# Patient Record
Sex: MALE | Race: WHITE | NOT HISPANIC OR LATINO | Employment: STUDENT | URBAN - METROPOLITAN AREA
[De-identification: names, ages, dates, MRNs, and addresses within clinical notes are randomized per-mention and may not be internally consistent; named-entity substitution may affect disease eponyms.]

---

## 2021-01-20 ENCOUNTER — TELEPHONE (OUTPATIENT)
Dept: FAMILY MEDICINE CLINIC | Facility: CLINIC | Age: 21
End: 2021-01-20

## 2021-02-03 NOTE — PROGRESS NOTES
FAMILY PRACTICE HEALTH MAINTENANCE OFFICE VISIT  [facility]    NAME: Nia Burns  AGE: 24 y o  SEX: male  : 2000     DATE: 2021    Assessment and Plan     1  Well adult exam    2  Post concussion syndrome  -     Ambulatory referral to Sports Medicine; Future    3  Nausea  Comments:  Advised pepcid otc bid and will check labs as above  Advised he should see GI due to his longstanding symptoms  Orders:  -     CBC and differential; Future  -     Comprehensive metabolic panel; Future  -     Celiac Disease Antibody Profile; Future  -     Ambulatory referral to Gastroenterology; Future    4  Other fatigue  -     TSH, 3rd generation with Free T4 reflex; Future  -     Thyroid Antibodies Panel; Future    5  Screening for cardiovascular condition  -     CBC and differential; Future  -     Comprehensive metabolic panel; Future  -     Lipid panel; Future    Suspect multiple nonspecific symptoms due to anxiety and depression  However, he denies this is the case and denies psych referral   Offered follow up but he prefers to follow up as needed after labwork and will see his specialist referrals as recommended  · Patient Counseling:   · Nutrition: Stressed importance of a well balanced diet, moderation of sodium/saturated fat, caloric balance and sufficient intake of fiber  · Exercise: Stressed the importance of regular exercise with a goal of 150 minutes per week  · Dental Health: Discussed daily flossing and brushing and regular dental visits     · Immunizations reviewed: Up To Date  · Discussed benefits of:  Screening labs  BMI Counseling: Body mass index is 19 8 kg/m²  Discussed with patient's BMI with him  The BMI is normal       Chief Complaint     Chief Complaint   Patient presents with    Establish Care     Needs PCP kim    Physical Exam       History of Present Illness     NP  Here for CPE  Has history of a concussion in 2019  Ulices Morgan down the steps   Got persistently worse and went to the ER, was diagnosed with concussion  Was having issues with short term memory, trouble concentrating, cognitive skills were worse, had headaches and nausea  Went to see someone for the concussion and was diagnosed with "post concussion syndrome "  They referred him to neurospsych but never actually gave him a referral   He feels as though his symptoms are back to baseline as far as that goes but will take a referral for the concussion syndrome  Now having nausea nearly daily and feels he can't keep anything down  Has lost about 14 pounds in the past couple of months  Has had constipation and has been to the ER for this in the past   No bloodwork in the past 2 years  The nausea has been worse ever since the concussion  Well Adult Physical   Patient here for a comprehensive physical exam       Diet and Physical Activity  Diet: well balanced diet  Exercise: rarely      Depression Screen  PHQ-9 Depression Screening    PHQ-9:   Frequency of the following problems over the past two weeks:      Little interest or pleasure in doing things: 2 - more than half the days  Feeling down, depressed, or hopeless: 3 - nearly every day  Trouble falling or staying asleep, or sleeping too much: 3 - nearly every day  Feeling tired or having little energy: 3 - nearly every day  Poor appetite or overeating: 3 - nearly every day  Feeling bad about yourself - or that you are a failure or have let yourself or your family down: 3 - nearly every day  Trouble concentrating on things, such as reading the newspaper or watching television: 3 - nearly every day  Moving or speaking so slowly that other people could have noticed   Or the opposite - being so fidgety or restless that you have been moving around a lot more than usual: 0 - not at all  Thoughts that you would be better off dead, or of hurting yourself in some way: 0 - not at all  PHQ-2 Score: 5  PHQ-9 Score: 20          General Health  Hearing: Normal:  bilateral  Vision: no vision problems  Dental: regular dental visits    Reproductive Health  No issues       The following portions of the patient's history were reviewed and updated as appropriate: allergies, current medications, past family history, past medical history, past social history, past surgical history and problem list     Review of Systems     Review of Systems   Constitutional: Negative  HENT: Negative  Eyes: Negative  Respiratory: Negative  Cardiovascular: Negative  Gastrointestinal: Positive for nausea  Endocrine: Negative  Genitourinary: Negative  Musculoskeletal: Negative  Skin: Negative  Allergic/Immunologic: Negative  Neurological: Positive for headaches  Hematological: Negative  Psychiatric/Behavioral: Positive for dysphoric mood and sleep disturbance  Negative for self-injury and suicidal ideas  Past Medical History     Past Medical History:   Diagnosis Date    Chest pain        Past Surgical History     History reviewed  No pertinent surgical history      Social History     Social History     Socioeconomic History    Marital status: Single     Spouse name: None    Number of children: None    Years of education: None    Highest education level: None   Occupational History    None   Social Needs    Financial resource strain: None    Food insecurity     Worry: None     Inability: None    Transportation needs     Medical: None     Non-medical: None   Tobacco Use    Smoking status: Current Some Day Smoker     Types: Cigarettes    Smokeless tobacco: Never Used   Substance and Sexual Activity    Alcohol use: Never     Frequency: Never    Drug use: Not Currently     Types: Marijuana    Sexual activity: None   Lifestyle    Physical activity     Days per week: None     Minutes per session: None    Stress: None   Relationships    Social connections     Talks on phone: None     Gets together: None     Attends Anglican service: None     Active member of club or organization: None     Attends meetings of clubs or organizations: None     Relationship status: None    Intimate partner violence     Fear of current or ex partner: None     Emotionally abused: None     Physically abused: None     Forced sexual activity: None   Other Topics Concern    None   Social History Narrative    None       Family History     Family History   Problem Relation Age of Onset    Hypothyroidism Mother     Hypertension Mother     Thyroid cancer Father     No Known Problems Brother        Current Medications     No current outpatient medications on file  Allergies     No Known Allergies    Objective     /68   Pulse 86   Temp (!) 97 2 °F (36 2 °C)   Resp 18   Ht 5' 9 5" (1 765 m)   Wt 61 7 kg (136 lb)   SpO2 97%   BMI 19 80 kg/m²      Physical Exam  Constitutional:       General: He is not in acute distress  Appearance: He is well-developed  HENT:      Head: Normocephalic and atraumatic  Right Ear: External ear normal       Left Ear: External ear normal       Nose: Nose normal    Eyes:      Conjunctiva/sclera: Conjunctivae normal       Pupils: Pupils are equal, round, and reactive to light  Neck:      Musculoskeletal: Normal range of motion and neck supple  Thyroid: No thyromegaly  Cardiovascular:      Rate and Rhythm: Normal rate and regular rhythm  Heart sounds: No murmur  No friction rub  No gallop  Pulmonary:      Effort: Pulmonary effort is normal       Breath sounds: Normal breath sounds  No wheezing or rales  Abdominal:      General: Bowel sounds are normal  There is no distension  Palpations: Abdomen is soft  Tenderness: There is abdominal tenderness in the epigastric area  Musculoskeletal: Normal range of motion  General: No tenderness or deformity  Lymphadenopathy:      Cervical: No cervical adenopathy  Skin:     General: Skin is dry  Findings: No rash  Neurological:      Mental Status: He is alert and oriented to person, place, and time  Cranial Nerves: No cranial nerve deficit  Motor: No abnormal muscle tone  Coordination: Coordination normal       Deep Tendon Reflexes: Reflexes are normal and symmetric  Reflexes normal    Psychiatric:         Mood and Affect: Mood is depressed  Behavior: Behavior normal          Thought Content: Thought content does not include homicidal or suicidal ideation  Thought content does not include homicidal or suicidal plan           Judgment: Judgment normal             Visual Acuity Screening    Right eye Left eye Both eyes   Without correction:      With correction: 13 20/15 [address] [facility]

## 2021-02-08 ENCOUNTER — OFFICE VISIT (OUTPATIENT)
Dept: FAMILY MEDICINE CLINIC | Facility: CLINIC | Age: 21
End: 2021-02-08
Payer: COMMERCIAL

## 2021-02-08 VITALS
HEART RATE: 86 BPM | SYSTOLIC BLOOD PRESSURE: 110 MMHG | OXYGEN SATURATION: 97 % | DIASTOLIC BLOOD PRESSURE: 68 MMHG | HEIGHT: 70 IN | BODY MASS INDEX: 19.47 KG/M2 | RESPIRATION RATE: 18 BRPM | WEIGHT: 136 LBS | TEMPERATURE: 97.2 F

## 2021-02-08 DIAGNOSIS — Z00.00 WELL ADULT EXAM: Primary | ICD-10-CM

## 2021-02-08 DIAGNOSIS — R11.0 NAUSEA: ICD-10-CM

## 2021-02-08 DIAGNOSIS — F07.81 POST CONCUSSION SYNDROME: ICD-10-CM

## 2021-02-08 DIAGNOSIS — Z13.6 SCREENING FOR CARDIOVASCULAR CONDITION: ICD-10-CM

## 2021-02-08 DIAGNOSIS — R53.83 OTHER FATIGUE: ICD-10-CM

## 2021-02-08 PROBLEM — Z13.31 POSITIVE DEPRESSION SCREENING: Status: ACTIVE | Noted: 2021-02-08

## 2021-02-08 PROCEDURE — 3725F SCREEN DEPRESSION PERFORMED: CPT | Performed by: INTERNAL MEDICINE

## 2021-02-08 PROCEDURE — 99385 PREV VISIT NEW AGE 18-39: CPT | Performed by: INTERNAL MEDICINE

## 2021-02-12 VITALS
HEART RATE: 70 BPM | DIASTOLIC BLOOD PRESSURE: 80 MMHG | BODY MASS INDEX: 20.14 KG/M2 | WEIGHT: 136 LBS | SYSTOLIC BLOOD PRESSURE: 129 MMHG | HEIGHT: 69 IN

## 2021-02-12 DIAGNOSIS — F07.81 POST CONCUSSION SYNDROME: Primary | ICD-10-CM

## 2021-02-12 DIAGNOSIS — R41.3 MEMORY CHANGE: ICD-10-CM

## 2021-02-12 DIAGNOSIS — F32.A DEPRESSION, UNSPECIFIED DEPRESSION TYPE: ICD-10-CM

## 2021-02-12 PROCEDURE — 99203 OFFICE O/P NEW LOW 30 MIN: CPT | Performed by: ORTHOPAEDIC SURGERY

## 2021-02-12 NOTE — PROGRESS NOTES
Assessment/Plan:  1  Post concussion syndrome  Ambulatory referral to Sports Medicine    Ambulatory referral to Psychiatry    Ambulatory referral to Physical Therapy    Ambulatory referral to Neurology   2  Depression, unspecified depression type  Ambulatory referral to Psychiatry    Ambulatory referral to Physical Therapy    Ambulatory referral to Neurology   3  Memory change  Ambulatory referral to Psychiatry    Ambulatory referral to Physical Therapy    Ambulatory referral to Neurology         Alireza Nick has a head injury over 1 year ago and was diagnosed with a concussion  He has ongoing symptoms today ever since that injury  His situation is quite complicated and I did have a lengthy discussion with him today about several recommendations  I informed him that my expertise in concussions is typically involved with acute injury seen right after the concussion itself  Prolonged injuries are best managed by a neurologist so I did give him a referral to a neurologist for further evaluation     I often see patients with prolong symptoms and there can be a certain element  Of continued symptoms at her secondary to another cause  I do feel that his depression is a significant factor in a lot of his symptoms and can explain a lot of what he is currently experiencing with the sleep disturbance, nausea and fatigue  I recommended consultation with psychiatrist and he was agreeable to this  I also recommended he undergo occupational therapy in neuro vestibular concussion therapy this can help with his visual disturbances and memory loss  He verbalized understanding of all of these recommendations and will follow up with referrals  He he does not need to follow-up with me and should continue with neurologist at this time as well as a psychiatrist       Patient ID: Asad Marte is a 24 y o  male   Presents to the office for evaluation for concussion which occurred over 1 year ago    He states he had a fall down the stairs on 12/18/2019  He was drinking alcohol at that time when he fell  He had symptoms of confusion and memory loss after he fell  Several days later he went to the emergency room and was evaluated  He had a CT at that time which was negative for a bleed or fracture  He states he did not follow-up with any family physician or concussion specialist at that time  He states he saw someone several months later who diagnosed with postconcussion syndrome and referred him to neuro psych  He was never given a physical referral and did not know where to go after that point  He has been complaining of persistent symptoms for the last several months and feels like the symptoms seem to be worsening  He has persistent headaches, fatigue, constipation, nausea and significant insomnia  He states he has a history of depression and this seems to be worsening as well but no formal clinical diagnosis and never any medical management for this  He states he has always had trouble sleeping ever since his head injury this sleeping has been significantly disturbed  Injury Description:  Date / Time:    12/18/2019  Injury Description:   Fall down stairs  Location:  Unknown  Cause:  Fall  LOC:  yes  Amnesia:   Retrograde:  yes   Anterograde:  yes   Seizures:  No  ER Visit: Yes  CT Scan:  Yes     Concussion Risk Factors:  History of Concussion: Yes, 3 prior  History of Migraines: No  Family History of Headache:  Yes  If yes, who?  mom  Developmental History:  none  Psychiatric History:  Depression  History of Sleep Disorder:  Yes  Do symptoms worsen with Physical Activity? No  Do symptoms worsen with Cognitive Activity?   Yes  What percent is this person back to normal?  Patient 85 %    Symptoms Checklist      Most Recent Value   Physical   Headache  1   Nausea  1   Vomiting  0   Balance problems  1   Dizziness  1   Visual problems  1   Fatigue  1   Sensitivity to light  1   Sensitivity to noise  0   Numbness / tingling  1   TOTAL PHYSICAL SCORE  8   Cognitive   Foggy  1   Slowed down  1   Difficulty concentrating  1   Difficulty remembering  1   TOTAL COGNITIVE SCORE  4   Emotional   Irritability  1   Sadness  1   More emotional  1   Nervousness  1   TOTAL EMOTIONAL SCORE  4   Sleep   Drowsiness  1   Sleeping less  1   Sleeping more  0   Difficulty falling asleep  1   TOTAL SLEEP SCORE  3   TOTAL SYMPTOM SCORE  19          Review of Systems   Constitutional: Positive for fatigue  Negative for chills, fever and unexpected weight change  HENT: Negative for hearing loss, nosebleeds and sore throat  Eyes: Positive for photophobia  Negative for pain, redness and visual disturbance  Respiratory: Negative for cough, shortness of breath and wheezing  Cardiovascular: Negative for chest pain, palpitations and leg swelling  Gastrointestinal: Positive for constipation and nausea  Negative for abdominal pain and vomiting  Endocrine: Negative for polyphagia and polyuria  Genitourinary: Negative for dysuria and hematuria  Musculoskeletal:        See HPI   Skin: Negative for rash and wound  Neurological: Positive for dizziness and headaches  Negative for numbness  Psychiatric/Behavioral: Positive for decreased concentration and sleep disturbance  Negative for suicidal ideas  The patient is nervous/anxious  Past Medical History:   Diagnosis Date    Chest pain        History reviewed  No pertinent surgical history      Family History   Problem Relation Age of Onset   Brandon Barriga Hypothyroidism Mother     Hypertension Mother     Thyroid cancer Father     No Known Problems Brother        Social History     Occupational History    Not on file   Tobacco Use    Smoking status: Current Some Day Smoker     Types: Cigarettes    Smokeless tobacco: Never Used   Substance and Sexual Activity    Alcohol use: Never     Frequency: Never    Drug use: Not Currently     Types: Marijuana    Sexual activity: Not on file       No current outpatient medications on file  No Known Allergies    Objective:  Vitals:    02/12/21 0930   BP: 129/80   Pulse: 70       Ortho Exam    Physical Exam  Vitals signs reviewed  Constitutional:       Appearance: He is well-developed  HENT:      Head: Normocephalic and atraumatic  Eyes:      Conjunctiva/sclera: Conjunctivae normal       Pupils: Pupils are equal, round, and reactive to light  Neck:      Musculoskeletal: Normal range of motion and neck supple  Cardiovascular:      Rate and Rhythm: Normal rate  Pulmonary:      Effort: Pulmonary effort is normal  No respiratory distress  Musculoskeletal:      Comments: As noted in HPI   Skin:     General: Skin is warm and dry  Neurological:      Mental Status: He is alert and oriented to person, place, and time     Psychiatric:         Behavior: Behavior normal          General:   NAD:  Yes  Psych:   AAOX3:  Yes   Mood and Affect:  Normal  HEENT:   Lacerations:  No   Bruising:  No   PEERLA:  Yes   EOMI: Yes   Fracture/Trauma:  No    Vestibular Ocular:     Gaze stability:    Nystagmus: no    Saccades: no/horizontal/vertical: headache with horizontal movement and headache with vertical movement    Vestibular Motion: normal    Convergence:  6cm  Neuro:   CNII - XII Intact:  Yes   Examination of Coordination:    Limited Balance:   No    Romberg:  normal    Forward Tandem Gait:  normal    Backward Tandem Gait:   normal    Eyes Close Tandem Gait:   normal        FTN: normal    Diadochokinesia: normal

## 2021-02-26 ENCOUNTER — CONSULT (OUTPATIENT)
Dept: GASTROENTEROLOGY | Facility: CLINIC | Age: 21
End: 2021-02-26
Payer: COMMERCIAL

## 2021-02-26 VITALS
SYSTOLIC BLOOD PRESSURE: 116 MMHG | DIASTOLIC BLOOD PRESSURE: 78 MMHG | HEIGHT: 69 IN | BODY MASS INDEX: 19.99 KG/M2 | TEMPERATURE: 97.9 F | HEART RATE: 78 BPM | WEIGHT: 135 LBS

## 2021-02-26 DIAGNOSIS — R11.0 NAUSEA: ICD-10-CM

## 2021-02-26 PROCEDURE — 99204 OFFICE O/P NEW MOD 45 MIN: CPT | Performed by: INTERNAL MEDICINE

## 2021-02-26 PROCEDURE — 3008F BODY MASS INDEX DOCD: CPT | Performed by: INTERNAL MEDICINE

## 2021-02-26 RX ORDER — FAMOTIDINE 20 MG/1
20 TABLET, FILM COATED ORAL 2 TIMES DAILY
Qty: 60 TABLET | Refills: 11 | Status: SHIPPED | OUTPATIENT
Start: 2021-02-26 | End: 2021-03-08

## 2021-02-26 RX ORDER — IBUPROFEN 200 MG
TABLET ORAL EVERY 6 HOURS PRN
COMMUNITY
End: 2021-03-23

## 2021-02-26 RX ORDER — ONDANSETRON 4 MG/1
4 TABLET, FILM COATED ORAL EVERY 8 HOURS PRN
Qty: 15 TABLET | Refills: 1 | Status: SHIPPED | OUTPATIENT
Start: 2021-02-26

## 2021-02-26 NOTE — PROGRESS NOTES
Consultation - [address] [facility]  Zak Valles 2000 male         Chief Complaint:  Nausea    HPI:  81-year-old male with history of concussion status post fall in December 2019 reports having lot of different issues since then  He sees neurologist   Reports having symptoms of anxiety, depression, insomnia, memory issues, decreased cognitive skills, vertigo, nausea  Complaining about discomfort in the upper abdomen  His symptoms are worse with stress  Denies any specific relationship with meal   Denies any NSAID use  He tells me that he was told by his neurologist that most of these symptoms are due to concussion  Regular bowel movements and denies any blood or mucus in the stool  He reports having few loose bowel movements last week  He was ordered for celiac disease panel by his PCP  REVIEW OF SYSTEMS: Review of Systems   Constitutional: Negative for activity change, appetite change, chills, diaphoresis, fatigue, fever and unexpected weight change  HENT: Negative for ear discharge, ear pain, facial swelling, hearing loss, nosebleeds, sore throat, tinnitus and voice change  Eyes: Negative for pain, discharge, redness, itching and visual disturbance  Respiratory: Negative for apnea, cough, chest tightness, shortness of breath and wheezing  Cardiovascular: Negative for chest pain and palpitations  Gastrointestinal:        As noted in HPI   Endocrine: Negative for cold intolerance, heat intolerance and polyuria  Genitourinary: Negative for difficulty urinating, dysuria, flank pain, hematuria and urgency  Musculoskeletal: Negative for arthralgias, back pain, gait problem, joint swelling and myalgias  Skin: Negative for rash and wound  Neurological: Negative for dizziness, tremors, seizures, speech difficulty, light-headedness, numbness and headaches  Hematological: Negative for adenopathy  Does not bruise/bleed easily     Psychiatric/Behavioral: Negative for agitation, behavioral problems and confusion  The patient is not nervous/anxious  Past Medical History:   Diagnosis Date    Chest pain       History reviewed  No pertinent surgical history    Social History     Socioeconomic History    Marital status: Single     Spouse name: Not on file    Number of children: Not on file    Years of education: Not on file    Highest education level: Not on file   Occupational History    Not on file   Social Needs    Financial resource strain: Not on file    Food insecurity     Worry: Not on file     Inability: Not on file    Transportation needs     Medical: Not on file     Non-medical: Not on file   Tobacco Use    Smoking status: Current Some Day Smoker     Packs/day: 0 25     Types: Cigarettes    Smokeless tobacco: Current User   Substance and Sexual Activity    Alcohol use: Yes     Frequency: Never     Comment: socially     Drug use: Yes     Types: Marijuana     Comment: socially     Sexual activity: Not on file   Lifestyle    Physical activity     Days per week: Not on file     Minutes per session: Not on file    Stress: Not on file   Relationships    Social connections     Talks on phone: Not on file     Gets together: Not on file     Attends Buddhism service: Not on file     Active member of club or organization: Not on file     Attends meetings of clubs or organizations: Not on file     Relationship status: Not on file    Intimate partner violence     Fear of current or ex partner: Not on file     Emotionally abused: Not on file     Physically abused: Not on file     Forced sexual activity: Not on file   Other Topics Concern    Not on file   Social History Narrative    Not on file     Family History   Problem Relation Age of Onset    Hypothyroidism Mother     Hypertension Mother     Thyroid cancer Father     No Known Problems Brother      Other  Current Outpatient Medications   Medication Sig Dispense Refill    ibuprofen (MOTRIN) 200 mg tablet Take by mouth every 6 (six) hours as needed for mild pain As needed      famotidine (PEPCID) 20 mg tablet Take 1 tablet (20 mg total) by mouth 2 (two) times a day 60 tablet 11    ondansetron (ZOFRAN) 4 mg tablet Take 1 tablet (4 mg total) by mouth every 8 (eight) hours as needed for nausea or vomiting 15 tablet 1     No current facility-administered medications for this visit  Blood pressure 116/78, pulse 78, temperature 97 9 °F (36 6 °C), height 5' 9" (1 753 m), weight 61 2 kg (135 lb)  PHYSICAL EXAM: Physical Exam  Constitutional:       Appearance: He is well-developed  HENT:      Head: Normocephalic and atraumatic  Eyes:      General: No scleral icterus  Right eye: No discharge  Left eye: No discharge  Conjunctiva/sclera: Conjunctivae normal       Pupils: Pupils are equal, round, and reactive to light  Neck:      Musculoskeletal: Neck supple  Thyroid: No thyromegaly  Vascular: No JVD  Trachea: No tracheal deviation  Cardiovascular:      Rate and Rhythm: Normal rate and regular rhythm  Heart sounds: Normal heart sounds  No murmur  No friction rub  No gallop  Pulmonary:      Effort: Pulmonary effort is normal  No respiratory distress  Breath sounds: Normal breath sounds  No wheezing or rales  Chest:      Chest wall: No tenderness  Abdominal:      General: Bowel sounds are normal  There is no distension  Palpations: Abdomen is soft  There is no mass  Tenderness: There is no abdominal tenderness  There is no guarding or rebound  Hernia: No hernia is present  Lymphadenopathy:      Cervical: No cervical adenopathy  Skin:     General: Skin is warm and dry  Findings: No erythema or rash  Neurological:      Mental Status: He is alert and oriented to person, place, and time  Psychiatric:         Behavior: Behavior normal          Thought Content:  Thought content normal           Lab Results   Component Value Date    WBC 6 76 03/20/2016    HGB 12 9 03/20/2016    HCT 38 9 03/20/2016    MCV 80 (L) 03/20/2016     03/20/2016     Lab Results   Component Value Date    CALCIUM 8 8 03/20/2016    K 3 8 03/20/2016    CO2 28 03/20/2016     03/20/2016    BUN 12 03/20/2016    CREATININE 0 60 03/20/2016     Lab Results   Component Value Date    ALT 21 03/20/2016    AST 25 03/20/2016    ALKPHOS 610 (H) 03/20/2016     No results found for: INR, PROTIME    Ct Abdomen Pelvis With Contrast    Result Date: 3/21/2016  Impression: 1  No acute inflammatory or infectious process identified  There is minimal free pelvic fluid noted however, an abnormal finding in a young male patient  Consider occult enteritis  2   Increased colorectal stool noted  Findings are consistent with the preliminary report from [facility] which was provided shortly after completion of the exam              Workstation performed: [id number]       ASSESSMENT & PLAN:    Nausea  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; rule out peptic ulcer disease or gastric erosions  Rule out H pylori gastritis  Doubt for biliary pathology  -explained to patient in detail about different possible etiologies and given reassurance  Explained about the pathophysiology of functional symptoms probably IBS related  -Patient was explained about the lifestyle and dietary modifications  Advised to avoid fatty foods, chocolates, caffeine, alcohol and any other triggering foods  Avoid eating for at least 3 hours before going to bed         -take Pepcid regularly     -try Zofran as needed    -schedule for EGD    -check right upper quadrant ultrasound    -advised him to get the celiac disease panel    -Patient was explained about  the risks and benefits of the procedure  Risks including but not limited to bleeding, infection, perforation were explained in detail  Also explained about less than 100% sensitivity with the exam and other alternatives

## 2021-02-26 NOTE — ASSESSMENT & PLAN NOTE
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; rule out peptic ulcer disease or gastric erosions  Rule out H pylori gastritis  Doubt for biliary pathology  -explained to patient in detail about different possible etiologies and given reassurance  Explained about the pathophysiology of functional symptoms probably IBS related  -Patient was explained about the lifestyle and dietary modifications  Advised to avoid fatty foods, chocolates, caffeine, alcohol and any other triggering foods  Avoid eating for at least 3 hours before going to bed         -take Pepcid regularly     -try Zofran as needed    -schedule for EGD    -check right upper quadrant ultrasound    -advised him to get the celiac disease panel    -Patient was explained about  the risks and benefits of the procedure  Risks including but not limited to bleeding, infection, perforation were explained in detail  Also explained about less than 100% sensitivity with the exam and other alternatives

## 2021-02-26 NOTE — H&P (VIEW-ONLY)
Consultation - [facility]  Kami Menain 2000 male         Chief Complaint:  Nausea    HPI:  81-year-old male with history of concussion status post fall in December 2019 reports having lot of different issues since then  He sees neurologist   Reports having symptoms of anxiety, depression, insomnia, memory issues, decreased cognitive skills, vertigo, nausea  Complaining about discomfort in the upper abdomen  His symptoms are worse with stress  Denies any specific relationship with meal   Denies any NSAID use  He tells me that he was told by his neurologist that most of these symptoms are due to concussion  Regular bowel movements and denies any blood or mucus in the stool  He reports having few loose bowel movements last week  He was ordered for celiac disease panel by his PCP  REVIEW OF SYSTEMS: Review of Systems   Constitutional: Negative for activity change, appetite change, chills, diaphoresis, fatigue, fever and unexpected weight change  HENT: Negative for ear discharge, ear pain, facial swelling, hearing loss, nosebleeds, sore throat, tinnitus and voice change  Eyes: Negative for pain, discharge, redness, itching and visual disturbance  Respiratory: Negative for apnea, cough, chest tightness, shortness of breath and wheezing  Cardiovascular: Negative for chest pain and palpitations  Gastrointestinal:        As noted in HPI   Endocrine: Negative for cold intolerance, heat intolerance and polyuria  Genitourinary: Negative for difficulty urinating, dysuria, flank pain, hematuria and urgency  Musculoskeletal: Negative for arthralgias, back pain, gait problem, joint swelling and myalgias  Skin: Negative for rash and wound  Neurological: Negative for dizziness, tremors, seizures, speech difficulty, light-headedness, numbness and headaches  Hematological: Negative for adenopathy  Does not bruise/bleed easily     Psychiatric/Behavioral: Negative for agitation, behavioral problems and confusion  The patient is not nervous/anxious  Past Medical History:   Diagnosis Date    Chest pain       History reviewed  No pertinent surgical history    Social History     Socioeconomic History    Marital status: Single     Spouse name: Not on file    Number of children: Not on file    Years of education: Not on file    Highest education level: Not on file   Occupational History    Not on file   Social Needs    Financial resource strain: Not on file    Food insecurity     Worry: Not on file     Inability: Not on file    Transportation needs     Medical: Not on file     Non-medical: Not on file   Tobacco Use    Smoking status: Current Some Day Smoker     Packs/day: 0 25     Types: Cigarettes    Smokeless tobacco: Current User   Substance and Sexual Activity    Alcohol use: Yes     Frequency: Never     Comment: socially     Drug use: Yes     Types: Marijuana     Comment: socially     Sexual activity: Not on file   Lifestyle    Physical activity     Days per week: Not on file     Minutes per session: Not on file    Stress: Not on file   Relationships    Social connections     Talks on phone: Not on file     Gets together: Not on file     Attends Muslim service: Not on file     Active member of club or organization: Not on file     Attends meetings of clubs or organizations: Not on file     Relationship status: Not on file    Intimate partner violence     Fear of current or ex partner: Not on file     Emotionally abused: Not on file     Physically abused: Not on file     Forced sexual activity: Not on file   Other Topics Concern    Not on file   Social History Narrative    Not on file     Family History   Problem Relation Age of Onset    Hypothyroidism Mother     Hypertension Mother     Thyroid cancer Father     No Known Problems Brother      Other  Current Outpatient Medications   Medication Sig Dispense Refill    ibuprofen (MOTRIN) 200 mg tablet Take by mouth every 6 (six) hours as needed for mild pain As needed      famotidine (PEPCID) 20 mg tablet Take 1 tablet (20 mg total) by mouth 2 (two) times a day 60 tablet 11    ondansetron (ZOFRAN) 4 mg tablet Take 1 tablet (4 mg total) by mouth every 8 (eight) hours as needed for nausea or vomiting 15 tablet 1     No current facility-administered medications for this visit  Blood pressure 116/78, pulse 78, temperature 97 9 °F (36 6 °C), height 5' 9" (1 753 m), weight 61 2 kg (135 lb)  PHYSICAL EXAM: Physical Exam  Constitutional:       Appearance: He is well-developed  HENT:      Head: Normocephalic and atraumatic  Eyes:      General: No scleral icterus  Right eye: No discharge  Left eye: No discharge  Conjunctiva/sclera: Conjunctivae normal       Pupils: Pupils are equal, round, and reactive to light  Neck:      Musculoskeletal: Neck supple  Thyroid: No thyromegaly  Vascular: No JVD  Trachea: No tracheal deviation  Cardiovascular:      Rate and Rhythm: Normal rate and regular rhythm  Heart sounds: Normal heart sounds  No murmur  No friction rub  No gallop  Pulmonary:      Effort: Pulmonary effort is normal  No respiratory distress  Breath sounds: Normal breath sounds  No wheezing or rales  Chest:      Chest wall: No tenderness  Abdominal:      General: Bowel sounds are normal  There is no distension  Palpations: Abdomen is soft  There is no mass  Tenderness: There is no abdominal tenderness  There is no guarding or rebound  Hernia: No hernia is present  Lymphadenopathy:      Cervical: No cervical adenopathy  Skin:     General: Skin is warm and dry  Findings: No erythema or rash  Neurological:      Mental Status: He is alert and oriented to person, place, and time  Psychiatric:         Behavior: Behavior normal          Thought Content:  Thought content normal           Lab Results   Component Value Date    WBC 6 76 03/20/2016    HGB 12 9 03/20/2016    HCT 38 9 03/20/2016    MCV 80 (L) 03/20/2016     03/20/2016     Lab Results   Component Value Date    CALCIUM 8 8 03/20/2016    K 3 8 03/20/2016    CO2 28 03/20/2016     03/20/2016    BUN 12 03/20/2016    CREATININE 0 60 03/20/2016     Lab Results   Component Value Date    ALT 21 03/20/2016    AST 25 03/20/2016    ALKPHOS 610 (H) 03/20/2016     No results found for: INR, PROTIME    Ct Abdomen Pelvis With Contrast    Result Date: 3/21/2016  Impression: 1  No acute inflammatory or infectious process identified  There is minimal free pelvic fluid noted however, an abnormal finding in a young male patient  Consider occult enteritis  2   Increased colorectal stool noted  Findings are consistent with the preliminary report from [facility] which was provided shortly after completion of the exam              Workstation performed: [id number]       ASSESSMENT & PLAN:    Nausea  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; rule out peptic ulcer disease or gastric erosions  Rule out H pylori gastritis  Doubt for biliary pathology  -explained to patient in detail about different possible etiologies and given reassurance  Explained about the pathophysiology of functional symptoms probably IBS related  -Patient was explained about the lifestyle and dietary modifications  Advised to avoid fatty foods, chocolates, caffeine, alcohol and any other triggering foods  Avoid eating for at least 3 hours before going to bed         -take Pepcid regularly     -try Zofran as needed    -schedule for EGD    -check right upper quadrant ultrasound    -advised him to get the celiac disease panel    -Patient was explained about  the risks and benefits of the procedure  Risks including but not limited to bleeding, infection, perforation were explained in detail  Also explained about less than 100% sensitivity with the exam and other alternatives

## 2021-02-27 ENCOUNTER — PREP FOR PROCEDURE (OUTPATIENT)
Dept: GASTROENTEROLOGY | Facility: CLINIC | Age: 21
End: 2021-02-27

## 2021-02-27 DIAGNOSIS — Z20.822 ENCOUNTER FOR LABORATORY TESTING FOR COVID-19 VIRUS: ICD-10-CM

## 2021-02-27 DIAGNOSIS — R11.0 NAUSEA: Primary | ICD-10-CM

## 2021-03-08 DIAGNOSIS — R11.0 NAUSEA: ICD-10-CM

## 2021-03-08 RX ORDER — FAMOTIDINE 20 MG/1
TABLET, FILM COATED ORAL
Qty: 60 TABLET | Refills: 11 | Status: SHIPPED | OUTPATIENT
Start: 2021-03-08 | End: 2021-03-26 | Stop reason: HOSPADM

## 2021-03-22 DIAGNOSIS — Z20.822 ENCOUNTER FOR LABORATORY TESTING FOR COVID-19 VIRUS: ICD-10-CM

## 2021-03-22 PROCEDURE — U0003 INFECTIOUS AGENT DETECTION BY NUCLEIC ACID (DNA OR RNA); SEVERE ACUTE RESPIRATORY SYNDROME CORONAVIRUS 2 (SARS-COV-2) (CORONAVIRUS DISEASE [COVID-19]), AMPLIFIED PROBE TECHNIQUE, MAKING USE OF HIGH THROUGHPUT TECHNOLOGIES AS DESCRIBED BY CMS-2020-01-R: HCPCS | Performed by: INTERNAL MEDICINE

## 2021-03-22 PROCEDURE — U0005 INFEC AGEN DETEC AMPLI PROBE: HCPCS | Performed by: INTERNAL MEDICINE

## 2021-03-23 LAB — SARS-COV-2 RNA RESP QL NAA+PROBE: NEGATIVE

## 2021-03-23 RX ORDER — AMITRIPTYLINE HYDROCHLORIDE 10 MG/1
TABLET, FILM COATED ORAL
COMMUNITY
Start: 2021-03-08

## 2021-03-23 RX ORDER — CEFADROXIL 500 MG/1
500 CAPSULE ORAL EVERY 12 HOURS
COMMUNITY
Start: 2021-03-08 | End: 2021-06-30

## 2021-03-23 NOTE — PRE-PROCEDURE INSTRUCTIONS
Pre-Surgery Instructions:   Medication Instructions    amitriptyline (ELAVIL) 10 mg tablet Instructed patient per Anesthesia Guidelines   cefadroxil (DURICEF) 500 mg capsule Instructed patient per Anesthesia Guidelines   famotidine (PEPCID) 20 mg tablet Instructed patient per Anesthesia Guidelines   ondansetron (ZOFRAN) 4 mg tablet Instructed patient per Anesthesia Guidelines  Pt to follow Dr Verenice Rodriguez instructions   Nivia Ceja Angelika [phone]

## 2021-03-25 ENCOUNTER — ANESTHESIA EVENT (OUTPATIENT)
Dept: GASTROENTEROLOGY | Facility: AMBULARY SURGERY CENTER | Age: 21
End: 2021-03-25

## 2021-03-25 PROBLEM — F17.200 SMOKING: Status: ACTIVE | Noted: 2021-03-25

## 2021-03-25 PROBLEM — F32.A DEPRESSION: Status: ACTIVE | Noted: 2021-03-25

## 2021-03-25 PROBLEM — K21.9 GASTROESOPHAGEAL REFLUX DISEASE: Status: ACTIVE | Noted: 2021-03-25

## 2021-03-25 PROBLEM — F41.9 ANXIETY: Status: ACTIVE | Noted: 2021-03-25

## 2021-03-25 NOTE — ANESTHESIA PREPROCEDURE EVALUATION
Procedure:  EGD    Relevant Problems   CARDIO   (+) Migraine      GI/HEPATIC   (+) Gastroesophageal reflux disease      NEURO/PSYCH   (+) Anxiety   (+) Depression      PULMONARY   (+) Smoking        Physical Exam    Airway    Mallampati score: II  TM Distance: >3 FB  Neck ROM: full     Dental       Cardiovascular  Rhythm: regular, Rate: normal,     Pulmonary  Breath sounds clear to auscultation,     Other Findings        Anesthesia Plan  ASA Score- 2     Anesthesia Type- IV sedation with anesthesia with ASA Monitors  Additional Monitors:   Airway Plan:     Comment: Orange juice at 8:30 am        Plan Factors-    Chart reviewed  Patient is not a current smoker  Induction- intravenous  Postoperative Plan-     Informed Consent- Anesthetic plan and risks discussed with patient  I personally reviewed this patient with the CRNA  Discussed and agreed on the Anesthesia Plan with the CRNA  Cheng Biggs

## 2021-03-26 ENCOUNTER — ANESTHESIA (OUTPATIENT)
Dept: GASTROENTEROLOGY | Facility: AMBULARY SURGERY CENTER | Age: 21
End: 2021-03-26

## 2021-03-26 ENCOUNTER — HOSPITAL ENCOUNTER (OUTPATIENT)
Dept: GASTROENTEROLOGY | Facility: AMBULARY SURGERY CENTER | Age: 21
Setting detail: OUTPATIENT SURGERY
Discharge: HOME | End: 2021-03-26
Attending: INTERNAL MEDICINE
Payer: COMMERCIAL

## 2021-03-26 VITALS
OXYGEN SATURATION: 99 % | BODY MASS INDEX: 21 KG/M2 | HEART RATE: 70 BPM | SYSTOLIC BLOOD PRESSURE: 108 MMHG | TEMPERATURE: 96.5 F | DIASTOLIC BLOOD PRESSURE: 58 MMHG | HEIGHT: 71 IN | WEIGHT: 150 LBS | RESPIRATION RATE: 20 BRPM

## 2021-03-26 DIAGNOSIS — K29.00 ACUTE SUPERFICIAL GASTRITIS WITHOUT HEMORRHAGE: Primary | ICD-10-CM

## 2021-03-26 DIAGNOSIS — R11.0 NAUSEA: ICD-10-CM

## 2021-03-26 LAB — GLUCOSE SERPL-MCNC: 89 MG/DL (ref 65–140)

## 2021-03-26 PROCEDURE — 88312 SPECIAL STAINS GROUP 1: CPT | Performed by: PATHOLOGY

## 2021-03-26 PROCEDURE — 88305 TISSUE EXAM BY PATHOLOGIST: CPT | Performed by: PATHOLOGY

## 2021-03-26 PROCEDURE — 88341 IMHCHEM/IMCYTCHM EA ADD ANTB: CPT | Performed by: PATHOLOGY

## 2021-03-26 PROCEDURE — 82948 REAGENT STRIP/BLOOD GLUCOSE: CPT

## 2021-03-26 PROCEDURE — 43239 EGD BIOPSY SINGLE/MULTIPLE: CPT | Performed by: INTERNAL MEDICINE

## 2021-03-26 PROCEDURE — 88342 IMHCHEM/IMCYTCHM 1ST ANTB: CPT | Performed by: PATHOLOGY

## 2021-03-26 PROCEDURE — 3008F BODY MASS INDEX DOCD: CPT | Performed by: INTERNAL MEDICINE

## 2021-03-26 RX ORDER — ONDANSETRON 2 MG/ML
INJECTION INTRAMUSCULAR; INTRAVENOUS AS NEEDED
Status: DISCONTINUED | OUTPATIENT
Start: 2021-03-26 | End: 2021-03-26

## 2021-03-26 RX ORDER — PANTOPRAZOLE SODIUM 40 MG/1
40 TABLET, DELAYED RELEASE ORAL DAILY
Qty: 30 TABLET | Refills: 3 | Status: SHIPPED | OUTPATIENT
Start: 2021-03-26

## 2021-03-26 RX ORDER — PROPOFOL 10 MG/ML
INJECTION, EMULSION INTRAVENOUS AS NEEDED
Status: DISCONTINUED | OUTPATIENT
Start: 2021-03-26 | End: 2021-03-26

## 2021-03-26 RX ORDER — SODIUM CHLORIDE, SODIUM LACTATE, POTASSIUM CHLORIDE, CALCIUM CHLORIDE 600; 310; 30; 20 MG/100ML; MG/100ML; MG/100ML; MG/100ML
75 INJECTION, SOLUTION INTRAVENOUS CONTINUOUS
Status: DISCONTINUED | OUTPATIENT
Start: 2021-03-26 | End: 2021-03-30 | Stop reason: HOSPADM

## 2021-03-26 RX ORDER — LIDOCAINE HYDROCHLORIDE 10 MG/ML
INJECTION, SOLUTION EPIDURAL; INFILTRATION; INTRACAUDAL; PERINEURAL AS NEEDED
Status: DISCONTINUED | OUTPATIENT
Start: 2021-03-26 | End: 2021-03-26

## 2021-03-26 RX ADMIN — PROPOFOL 100 MG: 10 INJECTION, EMULSION INTRAVENOUS at 12:20

## 2021-03-26 RX ADMIN — PROPOFOL 50 MG: 10 INJECTION, EMULSION INTRAVENOUS at 12:25

## 2021-03-26 RX ADMIN — LIDOCAINE HYDROCHLORIDE 50 MG: 10 INJECTION, SOLUTION EPIDURAL; INFILTRATION; INTRACAUDAL; PERINEURAL at 12:18

## 2021-03-26 RX ADMIN — PROPOFOL 50 MG: 10 INJECTION, EMULSION INTRAVENOUS at 12:27

## 2021-03-26 RX ADMIN — PROPOFOL 50 MG: 10 INJECTION, EMULSION INTRAVENOUS at 12:24

## 2021-03-26 RX ADMIN — ONDANSETRON 4 MG: 2 INJECTION INTRAMUSCULAR; INTRAVENOUS at 12:26

## 2021-03-26 RX ADMIN — SODIUM CHLORIDE, SODIUM LACTATE, POTASSIUM CHLORIDE, AND CALCIUM CHLORIDE 75 ML/HR: .6; .31; .03; .02 INJECTION, SOLUTION INTRAVENOUS at 10:58

## 2021-03-26 RX ADMIN — PROPOFOL 50 MG: 10 INJECTION, EMULSION INTRAVENOUS at 12:26

## 2021-03-26 RX ADMIN — PROPOFOL 150 MG: 10 INJECTION, EMULSION INTRAVENOUS at 12:18

## 2021-03-26 RX ADMIN — SODIUM CHLORIDE, SODIUM LACTATE, POTASSIUM CHLORIDE, AND CALCIUM CHLORIDE: .6; .31; .03; .02 INJECTION, SOLUTION INTRAVENOUS at 12:18

## 2021-03-26 RX ADMIN — PROPOFOL 50 MG: 10 INJECTION, EMULSION INTRAVENOUS at 12:22

## 2021-03-26 RX ADMIN — PROPOFOL 50 MG: 10 INJECTION, EMULSION INTRAVENOUS at 12:19

## 2021-03-26 NOTE — INTERVAL H&P NOTE
H&P reviewed  After examining the patient I find no changes in the patients condition since the H&P had been written      Vitals:    03/26/21 1043   BP: 131/78   Pulse: 70   Resp: 20   Temp: (!) 96 5 °F (35 8 °C)   SpO2: 100%

## 2021-04-05 ENCOUNTER — TELEPHONE (OUTPATIENT)
Dept: GASTROENTEROLOGY | Facility: CLINIC | Age: 21
End: 2021-04-05

## 2021-05-11 ENCOUNTER — TELEPHONE (OUTPATIENT)
Dept: GASTROENTEROLOGY | Facility: AMBULARY SURGERY CENTER | Age: 21
End: 2021-05-11

## 2021-05-11 NOTE — TELEPHONE ENCOUNTER
----- Message from Carrie Callaway MD sent at 4/2/2021 12:33 PM EDT -----  All the biopsies came back as benign    Patient to continue Protonix regularly and schedule for repeat EGD in 3 months to check on the inflammation at the GE junction

## 2021-05-11 NOTE — TELEPHONE ENCOUNTER
Spoke to pt  Pt is still having issues with nausea and vomitting  I scheduled pt for office visit w/ dr Deloris Ingram 5/19/2021   Pt has also been scheduled for his repeat EGD w/ dr Deloris Ingram at [place] [address] 7/7/2021/COVID TEST 1/7/2021/ [facility]

## 2021-05-19 ENCOUNTER — OFFICE VISIT (OUTPATIENT)
Dept: GASTROENTEROLOGY | Facility: AMBULARY SURGERY CENTER | Age: 21
End: 2021-05-19
Payer: COMMERCIAL

## 2021-05-19 VITALS
BODY MASS INDEX: 19.35 KG/M2 | WEIGHT: 138.2 LBS | SYSTOLIC BLOOD PRESSURE: 130 MMHG | HEIGHT: 71 IN | DIASTOLIC BLOOD PRESSURE: 72 MMHG

## 2021-05-19 DIAGNOSIS — R11.0 NAUSEA: Primary | ICD-10-CM

## 2021-05-19 DIAGNOSIS — R63.4 WEIGHT LOSS: ICD-10-CM

## 2021-05-19 DIAGNOSIS — R10.84 GENERALIZED ABDOMINAL PAIN: ICD-10-CM

## 2021-05-19 PROCEDURE — 3008F BODY MASS INDEX DOCD: CPT | Performed by: INTERNAL MEDICINE

## 2021-05-19 PROCEDURE — 99214 OFFICE O/P EST MOD 30 MIN: CPT | Performed by: INTERNAL MEDICINE

## 2021-05-19 NOTE — PROGRESS NOTES
Follow-up Note -  [facility]  Crispin Westbrook 2000 male         Reason:  Follow-up    HPI:  Mr Betsy Leger in follow-up along with his mother  I did upper endoscopy on him which was unremarkable except some inflamed mucosa at the GE junction and the biopsies came back as benign  He has been taking Protonix regularly and schedule for repeat EGD in couple of months  He still complains about nausea that was started after the concussion back in December  He also reports having other symptoms of headaches, shaking, passing out, insomnia, decreased appetite  He was seen by neurologist before but currently follows with a concussion specialist and had CT scans and MRIs  He was advised to see a psychiatrist also  Complaining about pain in abdomen which is nonspecific and denies any spasms  Appetite has been poor and lost some weight  Denies any NSAID use  Denies any heartburn acid reflux  Regular bowel movements and denies any blood or mucus in the stool  He did not go for ultrasound or celiac disease panel  REVIEW OF SYSTEMS: Review of Systems   Constitutional: Positive for activity change, appetite change and diaphoresis  Negative for chills, fatigue, fever and unexpected weight change  HENT: Negative for ear discharge, ear pain, facial swelling, hearing loss, nosebleeds, sore throat, tinnitus and voice change  Eyes: Negative for pain, discharge, redness, itching and visual disturbance  Respiratory: Positive for shortness of breath  Negative for apnea, cough, chest tightness and wheezing  Cardiovascular: Negative for chest pain and palpitations  Gastrointestinal:        As noted in HPI   Endocrine: Negative for cold intolerance, heat intolerance and polyuria  Genitourinary: Negative for difficulty urinating, dysuria, flank pain, hematuria and urgency  Musculoskeletal: Positive for arthralgias and myalgias   Negative for back pain, gait problem and joint swelling  Skin: Negative for rash and wound  Neurological: Positive for headaches  Negative for dizziness, tremors, seizures, speech difficulty, light-headedness and numbness  Hematological: Negative for adenopathy  Does not bruise/bleed easily  Psychiatric/Behavioral: Negative for agitation, behavioral problems and confusion  The patient is nervous/anxious           Past Medical History:   Diagnosis Date    Anxiety     Chest pain     gastric related    Depression     GERD (gastroesophageal reflux disease)     Migraine     Shortness of breath     intermittent- "a few times a week"-"feels like I'm losing oxygen"    Wears glasses       Past Surgical History:   Procedure Laterality Date    TOOTH EXTRACTION      UPPER GASTROINTESTINAL ENDOSCOPY       Social History     Socioeconomic History    Marital status: Single     Spouse name: Not on file    Number of children: Not on file    Years of education: Not on file    Highest education level: Not on file   Occupational History    Not on file   Social Needs    Financial resource strain: Not on file    Food insecurity     Worry: Not on file     Inability: Not on file    Transportation needs     Medical: Not on file     Non-medical: Not on file   Tobacco Use    Smoking status: Current Some Day Smoker     Types: Cigarettes    Smokeless tobacco: Current User    Tobacco comment: smokes 2 times a week   Substance and Sexual Activity    Alcohol use: Not Currently     Comment: socially     Drug use: Yes     Types: Marijuana     Comment: socially     Sexual activity: Not on file   Lifestyle    Physical activity     Days per week: Not on file     Minutes per session: Not on file    Stress: Not on file   Relationships    Social connections     Talks on phone: Not on file     Gets together: Not on file     Attends Confucianist service: Not on file     Active member of club or organization: Not on file     Attends meetings of clubs or organizations: Not on file     Relationship status: Not on file    Intimate partner violence     Fear of current or ex partner: Not on file     Emotionally abused: Not on file     Physically abused: Not on file     Forced sexual activity: Not on file   Other Topics Concern    Not on file   Social History Narrative    Not on file     Family History   Problem Relation Age of Onset    Hypothyroidism Mother     Hypertension Mother     Thyroid cancer Father     Asthma Brother         as a child    Allergy (severe) Sister         food     Other  Current Outpatient Medications   Medication Sig Dispense Refill    pantoprazole (PROTONIX) 40 mg tablet Take 1 tablet (40 mg total) by mouth daily 30 tablet 3    amitriptyline (ELAVIL) 10 mg tablet TAKE 1 TABLET BY MOUTH AT BEDTIME AS NEEDED AS DIRECTED      cefadroxil (DURICEF) 500 mg capsule Take 500 mg by mouth every 12 (twelve) hours      ondansetron (ZOFRAN) 4 mg tablet Take 1 tablet (4 mg total) by mouth every 8 (eight) hours as needed for nausea or vomiting (Patient not taking: Reported on 5/19/2021) 15 tablet 1     No current facility-administered medications for this visit  Blood pressure 130/72, height 5' 11" (1 803 m), weight 62 7 kg (138 lb 3 2 oz)  PHYSICAL EXAM: Physical Exam  Constitutional:       Appearance: He is well-developed  HENT:      Head: Normocephalic and atraumatic  Eyes:      General: No scleral icterus  Right eye: No discharge  Left eye: No discharge  Conjunctiva/sclera: Conjunctivae normal       Pupils: Pupils are equal, round, and reactive to light  Neck:      Musculoskeletal: Neck supple  Thyroid: No thyromegaly  Vascular: No JVD  Trachea: No tracheal deviation  Cardiovascular:      Rate and Rhythm: Normal rate and regular rhythm  Heart sounds: Normal heart sounds  No murmur  No friction rub  No gallop  Pulmonary:      Effort: Pulmonary effort is normal  No respiratory distress        Breath sounds: Normal breath sounds  No wheezing or rales  Chest:      Chest wall: No tenderness  Abdominal:      General: Bowel sounds are normal  There is no distension  Palpations: Abdomen is soft  There is no mass  Tenderness: There is no abdominal tenderness  There is no guarding or rebound  Hernia: No hernia is present  Lymphadenopathy:      Cervical: No cervical adenopathy  Skin:     General: Skin is warm and dry  Findings: No erythema or rash  Neurological:      Mental Status: He is alert and oriented to person, place, and time  Psychiatric:         Behavior: Behavior normal          Thought Content: Thought content normal           Lab Results   Component Value Date    WBC 6 76 03/20/2016    HGB 12 9 03/20/2016    HCT 38 9 03/20/2016    MCV 80 (L) 03/20/2016     03/20/2016     Lab Results   Component Value Date    CALCIUM 8 8 03/20/2016    K 3 8 03/20/2016    CO2 28 03/20/2016     03/20/2016    BUN 12 03/20/2016    CREATININE 0 60 03/20/2016     Lab Results   Component Value Date    ALT 21 03/20/2016    AST 25 03/20/2016    ALKPHOS 610 (H) 03/20/2016     No results found for: INR, PROTIME    No results found  ASSESSMENT & PLAN:    Nausea    Patient's symptoms appear to be functional nonulcer dyspepsia and IBS  But should rule out any other underlying neurologic issues since he also has other associated symptoms  - continue Protonix    - repeat EGD in couple of months as scheduled    - check right upper quadrant ultrasound to rule out biliary pathology    - celiac disease panel and food allergy profile    - symptomatic treatment with Zofran    - encourage him to eat more and take nutritional supplements    - patient to follow-up with concussion specialist and also see a psychiatrist as he was referred    Generalized abdominal pain   Nonspecific abdominal pain but concerned about decreased appetite and weight loss      -  Schedule for CT scan of the abdomen pelvis

## 2021-05-19 NOTE — ASSESSMENT & PLAN NOTE
Nonspecific abdominal pain but concerned about decreased appetite and weight loss      -  Schedule for CT scan of the abdomen pelvis

## 2021-05-19 NOTE — ASSESSMENT & PLAN NOTE
Patient's symptoms appear to be functional nonulcer dyspepsia and IBS  But should rule out any other underlying neurologic issues since he also has other associated symptoms       - continue Protonix    - repeat EGD in couple of months as scheduled    - check right upper quadrant ultrasound to rule out biliary pathology    - celiac disease panel and food allergy profile    - symptomatic treatment with Zofran    - encourage him to eat more and take nutritional supplements    - patient to follow-up with concussion specialist and also see a psychiatrist as he was referred

## 2021-05-20 ENCOUNTER — TELEPHONE (OUTPATIENT)
Dept: NEUROLOGY | Facility: CLINIC | Age: 21
End: 2021-05-20

## 2021-05-24 ENCOUNTER — TELEPHONE (OUTPATIENT)
Dept: PSYCHIATRY | Facility: CLINIC | Age: 21
End: 2021-05-24

## 2021-05-24 ENCOUNTER — TELEPHONE (OUTPATIENT)
Dept: OBGYN CLINIC | Facility: HOSPITAL | Age: 21
End: 2021-05-24

## 2021-05-24 NOTE — TELEPHONE ENCOUNTER
Kirstin Rockwell is calling to ask for Dr Candance Palma assistance in finding a Psychiatrist   Patient has been calling multiple offices for months and cannot get anyone to call him back  Patient is asking for Dr Candance Palma assistance and is willing to travel to a  provider        Kirstin Rockwell [phone]

## 2021-05-24 NOTE — TELEPHONE ENCOUNTER
Called patient suggested for him to contact his insurance company and see who they are in network with  Also gave him a name of a [facility] neuro-Psychiatry and just a regular Psychiatry

## 2021-05-24 NOTE — TELEPHONE ENCOUNTER
Patient called and would like to set up an apt he has a referral on file he said a good time to contact him is between 1:00 and 4:00 can you please give him a call thank you

## 2021-06-29 ENCOUNTER — TELEPHONE (OUTPATIENT)
Dept: PREADMISSION TESTING | Facility: HOSPITAL | Age: 21
End: 2021-06-29

## 2021-06-30 NOTE — PRE-PROCEDURE INSTRUCTIONS
Pre-Surgery Instructions:   Medication Instructions    amitriptyline (ELAVIL) 10 mg tablet Patient was instructed by Physician and understands   pantoprazole (PROTONIX) 40 mg tablet Patient was instructed by Physician and understands

## 2021-07-06 ENCOUNTER — TELEPHONE (OUTPATIENT)
Dept: GASTROENTEROLOGY | Facility: AMBULARY SURGERY CENTER | Age: 21
End: 2021-07-06

## 2021-07-06 NOTE — TELEPHONE ENCOUNTER
Pt called the GI office re: having to reschedule egd from 7/7/2021 at [facility] to 1/98/1289 due to a conflict w/ his work schedule

## 2021-07-06 NOTE — TELEPHONE ENCOUNTER
LM for pt's father-pt's father had spoken to More Zhang in [place] @ [place] re: pt may have to reschedule egd for 7/7/2021 due to being away at camp

## 2021-09-08 ENCOUNTER — TELEPHONE (OUTPATIENT)
Dept: PREADMISSION TESTING | Facility: HOSPITAL | Age: 21
End: 2021-09-08

## 2021-09-10 NOTE — TELEPHONE ENCOUNTER
Received voicemail from [address] [place] [address] [address]  covid testing/ vaccination status from [facility]  Left message on mother Jaren's phone to try top contact patient to see if he is getting covid testing or already vaccinated   Left callback number of [phone]

## 2021-09-13 ENCOUNTER — TELEPHONE (OUTPATIENT)
Dept: GASTROENTEROLOGY | Facility: AMBULARY SURGERY CENTER | Age: 21
End: 2021-09-13

## 2022-11-25 ENCOUNTER — TELEPHONE (OUTPATIENT)
Dept: GASTROENTEROLOGY | Facility: AMBULARY SURGERY CENTER | Age: 22
End: 2022-11-25